# Patient Record
Sex: MALE | URBAN - METROPOLITAN AREA
[De-identification: names, ages, dates, MRNs, and addresses within clinical notes are randomized per-mention and may not be internally consistent; named-entity substitution may affect disease eponyms.]

---

## 2021-08-06 ENCOUNTER — CLINICAL SUPPORT (OUTPATIENT)
Dept: URGENT CARE | Facility: CLINIC | Age: 56
End: 2021-08-06
Payer: COMMERCIAL

## 2021-08-06 DIAGNOSIS — Z11.9 ENCOUNTER FOR SCREENING EXAMINATION FOR INFECTIOUS DISEASE: Primary | ICD-10-CM

## 2021-08-06 LAB
CTP QC/QA: YES
SARS-COV-2 RDRP RESP QL NAA+PROBE: NEGATIVE

## 2021-08-06 PROCEDURE — U0002: ICD-10-PCS | Mod: QW,S$GLB,, | Performed by: NURSE PRACTITIONER

## 2021-08-06 PROCEDURE — 99211 PR OFFICE/OUTPT VISIT, EST, LEVL I: ICD-10-PCS | Mod: S$GLB,,, | Performed by: NURSE PRACTITIONER

## 2021-08-06 PROCEDURE — U0002 COVID-19 LAB TEST NON-CDC: HCPCS | Mod: QW,S$GLB,, | Performed by: NURSE PRACTITIONER

## 2021-08-06 PROCEDURE — 99211 OFF/OP EST MAY X REQ PHY/QHP: CPT | Mod: S$GLB,,, | Performed by: NURSE PRACTITIONER

## 2022-07-20 ENCOUNTER — OFFICE VISIT (OUTPATIENT)
Dept: URGENT CARE | Facility: CLINIC | Age: 57
End: 2022-07-20
Payer: COMMERCIAL

## 2022-07-20 VITALS
HEART RATE: 79 BPM | DIASTOLIC BLOOD PRESSURE: 85 MMHG | WEIGHT: 211 LBS | RESPIRATION RATE: 20 BRPM | HEIGHT: 67 IN | SYSTOLIC BLOOD PRESSURE: 123 MMHG | OXYGEN SATURATION: 96 % | TEMPERATURE: 100 F | BODY MASS INDEX: 33.12 KG/M2

## 2022-07-20 DIAGNOSIS — U07.1 LAB TEST POSITIVE FOR DETECTION OF COVID-19 VIRUS: ICD-10-CM

## 2022-07-20 DIAGNOSIS — U07.1 COVID-19 VIRUS DETECTED: ICD-10-CM

## 2022-07-20 DIAGNOSIS — U07.1 COVID-19 VIRUS DETECTED: Primary | ICD-10-CM

## 2022-07-20 DIAGNOSIS — R05.9 COUGH: ICD-10-CM

## 2022-07-20 LAB
CTP QC/QA: YES
SARS-COV-2 RDRP RESP QL NAA+PROBE: POSITIVE

## 2022-07-20 PROCEDURE — 99213 PR OFFICE/OUTPT VISIT, EST, LEVL III, 20-29 MIN: ICD-10-PCS | Mod: S$GLB,,, | Performed by: NURSE PRACTITIONER

## 2022-07-20 PROCEDURE — 1160F PR REVIEW ALL MEDS BY PRESCRIBER/CLIN PHARMACIST DOCUMENTED: ICD-10-PCS | Mod: CPTII,S$GLB,, | Performed by: NURSE PRACTITIONER

## 2022-07-20 PROCEDURE — 3074F SYST BP LT 130 MM HG: CPT | Mod: CPTII,S$GLB,, | Performed by: NURSE PRACTITIONER

## 2022-07-20 PROCEDURE — 1159F MED LIST DOCD IN RCRD: CPT | Mod: CPTII,S$GLB,, | Performed by: NURSE PRACTITIONER

## 2022-07-20 PROCEDURE — 1159F PR MEDICATION LIST DOCUMENTED IN MEDICAL RECORD: ICD-10-PCS | Mod: CPTII,S$GLB,, | Performed by: NURSE PRACTITIONER

## 2022-07-20 PROCEDURE — 3008F PR BODY MASS INDEX (BMI) DOCUMENTED: ICD-10-PCS | Mod: CPTII,S$GLB,, | Performed by: NURSE PRACTITIONER

## 2022-07-20 PROCEDURE — 99213 OFFICE O/P EST LOW 20 MIN: CPT | Mod: S$GLB,,, | Performed by: NURSE PRACTITIONER

## 2022-07-20 PROCEDURE — 3074F PR MOST RECENT SYSTOLIC BLOOD PRESSURE < 130 MM HG: ICD-10-PCS | Mod: CPTII,S$GLB,, | Performed by: NURSE PRACTITIONER

## 2022-07-20 PROCEDURE — 3008F BODY MASS INDEX DOCD: CPT | Mod: CPTII,S$GLB,, | Performed by: NURSE PRACTITIONER

## 2022-07-20 PROCEDURE — U0002: ICD-10-PCS | Mod: QW,S$GLB,, | Performed by: NURSE PRACTITIONER

## 2022-07-20 PROCEDURE — U0002 COVID-19 LAB TEST NON-CDC: HCPCS | Mod: QW,S$GLB,, | Performed by: NURSE PRACTITIONER

## 2022-07-20 PROCEDURE — 1160F RVW MEDS BY RX/DR IN RCRD: CPT | Mod: CPTII,S$GLB,, | Performed by: NURSE PRACTITIONER

## 2022-07-20 PROCEDURE — 3079F PR MOST RECENT DIASTOLIC BLOOD PRESSURE 80-89 MM HG: ICD-10-PCS | Mod: CPTII,S$GLB,, | Performed by: NURSE PRACTITIONER

## 2022-07-20 PROCEDURE — 3079F DIAST BP 80-89 MM HG: CPT | Mod: CPTII,S$GLB,, | Performed by: NURSE PRACTITIONER

## 2022-07-20 RX ORDER — ATORVASTATIN CALCIUM 10 MG/1
10 TABLET, FILM COATED ORAL DAILY
COMMUNITY
Start: 2022-07-09

## 2022-07-20 NOTE — PATIENT INSTRUCTIONS
PLEASE READ YOUR DISCHARGE INSTRUCTIONS ENTIRELY AS IT CONTAINS IMPORTANT INFORMATION.    Patient had covid testing done today.  Discussed corona virus precautions and reviewed CDC FAC; printed a copy for patient.  I discussed to continue to monitor their symptoms. Discussed that if their symptoms persist or worsen to seek re-evaluation. Clinic vs. ER precautions were given.  Patient verbalized understanding and agreed with the entire plan of care.    If Negative and no direct exposure: symptom free without fever reducing meds in 24 hours - can go back to work in 24 hours with surgical mask for 10-14 days.    If Positive and significantly symptomatic: improved symptoms, no fever without fever reducing meds for 24 hours- have to be out for a minimum of 10 days passed from + test  with improvements in symptoms. MAY COME OUT OF QUARANTINE ON DAY 11.      If you tested positive and have symptoms, you must isolate for 5 days starting on the day of your first symptoms  OR day of the positive test if you are asymptomatic. After 5 days (ON DAY #6), if your symptoms have improved and you have not had fever on day 5, you can return to the community on day #6- NO TESTING REQUIRED to return to community! If no fever without fever reducing meds for 24 hours, before coming out of quarantine. After your 5 days of isolation are completed, the CDC recommends strict mask use for the first 5 days (day #6-10) that you come out of isolation.  MAY COME OUT OF QUARANTINE ON DAY#6 DATE** BUT CONTINUE STRICT MASKS FOR ANOTHER 5 DAYS. QUARANTINE START DATE**    This is the most important part, both the CDC and the [facility] emphasize that you do not test out of isolation. In fact, we do not retest if you were positive in the last 90 days.           - Reviewed radiographs and all diagnostic testing with patient/family.    - Rest.  Drink plenty of fluids.    - Tylenol OR anti-inflammatory (NSAIDs, ibuprofen, aleve, motrin) as directed as needed  for fever/pain.  For Tylenol, do not exceed 3000 mg/ day. If no contraindication or allergies.    - continue albuterol inhaler as needed for shortness of breath/wheezing  - do not operate machinery when taking cough syrup or pain meds as they may cause drowsiness.  - take Tessalon as needed for cough suppression. Take cough syrup as needed for cough during at night.     - If you were prescribed antibiotics, please take them to completion. Please supplement with OTC probiotics and yogurt.  Contact clinic if develop profuse diarrhea and weakness.  - If you are female and on birth control pills - please use additional methods of contraception to prevent pregnancy while on antibiotics and for one cycle after.     -Below are suggestions for symptomatic relief:              -Salt water gargles to soothe throat pain.              -Chloroseptic spray also helps to numb throat pain.              -Nasal saline spray reduces inflammation and dryness.              -Warm face compresses to help with facial sinus pain/pressure.              -Vicks vapor rub at night.              -Astelin NASAL SPRAY twice day for nasal/sinus congestion   **may also supplement with 2nd OTC nasal spray to help with inflammation and congestion. Wean to off when you nose becomes to dry or bleed. Also use nasal saline twice a day to help with dryness.               -Flonase OTC or Nasacort OTC  once a day for nasal/sinus congestion. DON'T USE IF YOU HAVE GLAUCOMA. CHECK WITH YOUR PHARMACIST/PHYSICIAN.              -Simple foods like chicken noodle soup.              -Mucinex DM (ANY COUGH EXPECTORANT) for cough or chest congestion with mucus during the day time. Delsym or robitussin (ANY COUGH SUPPRESSANT) helps with coughing at night. Mucinex-D if you have chest congestion or sputum (caution if history of high blood pressure or palpitations).              -Zyrtec/Claritin/xyzal during the day time  & Benadryl at night (only if severe runny nose) may  help with allergies and runny nose. Add decongestant if you have nasal/sinus congestion/sinus pressure/ear fullness sensation. (see below)              -may take OTC meclizine as needed for dizziness or nausea.     Caution with use of Decongestant meds:  -If you DO NOT have Hypertension or any history of palpitations, it is ok to take over the counter Sudafed or Mucinex D or Allegra-D or Claritin-D or Zyrtec-D.  -If you do take one of the above, it is ok to combine that with plain over the counter Mucinex or Allegra or Claritin or Zyrtec. If, for example, you are taking Zyrtec -D, you can combine that with Mucinex, but not Mucinex-D.  If you are taking Mucinex-D, you can combine that with plain Allegra or Claritin or Zyrtec.     -Do not combine pseudophed or phenylephrine with any other brand allergy-D for DECONGESTANT.   -Or vice versa, you can you take plain allergy medications (allegra/claritin/zyrtec with NO Decongestant) and ADD OTC pseudophed or phenelyphrine 2-3 times a day. Avoid taking decongestant late at night or with caffeine as it can keep you up or cause jittery feeling.    -If you DO have Hypertension , anxiety, or palpitations, it is safe to take Coricidin HBP for relief of sinus symptoms.      For your GI symptoms:  -Use gatorade/pedialyte or rehydration packets to help stay hydrated. Vitamin water and plain water do not contain rehydrating electrolytes.  -Increase clear liquids (water, gatorade, pedialyte, broths, jello, etc) Hold off on solids for 12-18 hours. Then advance to BRAT diet (banana, rice, applesauce, tea, toast/crackers), then advance further as tolerated. Avoid spicy or fatty foods.   -May take Emitrol OTC as needed for nausea.   -Use Peptobismol or Immodium to help alleviate your diarrhea symptoms.   -Take mylanta or simethicone for bloating or gas pain.   -Take pepcid or omeprazole if you have heartburn or reflux sensation.  -Avoid imodium unless you have more than 6 loose stools in  24 hours. Take 1 dose and monitor to see if you can repeat AS IT WILL CAUSE CONSTIPATION.  -Wash hands frequently while sick. Avoid ibuprofen or other NSAIDS until you are well.   -Please go to the ER if you experience worsening abdominal pain, blood in your vomit or stool, high fever, dizziness, fainting, swelling of your abdomen, inability to pass gas or stool, or inability to urinate.         -You must understand that you've received an Urgent Care treatment only and that you may be released before all your medical problems are known or treated. You, the patient, will arrange for follow up care as instructed. Please arrange follow up with your primary medical clinic within 2-5 days if your signs and symptoms have not resolved or worsen.     - Follow up with your PCP or specialty clinic as directed.  You can call [phone] or [phone] to schedule an appointment with the appropriate provider.  Schedule [facility] is open Mon-Friday 8-5pm (excluded holidays).    - If your condition worsens or fails to improve we recommend that you receive another evaluation at the emergency room immediately or contact your primary medical clinic to discuss your concerns.            Prevention steps for patients with confirmed or suspected COVID-19  Stay home and stay away from family members and friends. The CDC says, you can leave home after these three things have happened: 1) You have had no fever for at least 24 hours (that is one full day of no fever without the use of medicine that reduces fevers) 2) AND other symptoms have improved (for example, when your cough or shortness of breath have improved) 3) AND at least 10 days have passed since your symptoms first appeared OR after 7-10 days passed from first positive test.  Separate yourself from other people and animals in your home.  Call ahead before visiting your doctor.  Wear a facemask.  Cover your coughs and sneezes.  Wash your hands often with soap and water; hand   can be used, too.  Avoid sharing personal household items.  Wipe down surfaces used daily.  Monitor your symptoms. Seek prompt medical attention if your illness is worsening (e.g., difficulty breathing).   Before seeking care, call your healthcare provider.  If you have a medical emergency and need to call 911, notify the dispatch personnel that you have, or are being evaluated for COVID-19. If possible, put on a facemask before emergency medical services arrive.        Recommended precautions for household members, intimate partners, and caregivers in a home setting of a patient with symptomatic laboratory-confirmed COVID-19 or a patient under investigation.  Household members, intimate partners, and caregivers in the home setting awaiting tests results have close contact with a person with symptomatic, laboratory-confirmed COVID-19 or a person under investigation. Close contacts should monitor their health; they should call their provider right away if they develop symptoms suggestive of COVID-19 (e.g., fever, cough, shortness of breath).    Close contacts should also follow these recommendations:  Make sure that you understand and can help the patient follow their provider's instructions for medication(s) and care. You should help the patient with basic needs in the home and provide support for getting groceries, prescriptions, and other personal needs.  Monitor the patient's symptoms. If the patient is getting sicker, call his or her healthcare provider and tell them that the patient has laboratory-confirmed COVID-19. If the patient has a medical emergency and you need to call 911, notify the dispatch personnel that the patient has, or is being evaluated for COVID-19.  Household members should stay in another room or be  from the patient. Household members should use a separate bedroom and bathroom, if available.  Prohibit visitors.  Household members should care for any pets in the  home.  Make sure that shared spaces in the home have good air flow, such as by an air conditioner or an opened window, weather permitting.  Perform hand hygiene frequently. Wash your hands often with soap and water for at least 20 seconds or use an alcohol-based hand  (that contains > 60% alcohol) covering all surfaces of your hands and rubbing them together until they feel dry. Soap and water should be used preferentially.  Avoid touching your eyes, nose, and mouth.  The patient should wear a facemask. If the patient is not able to wear a facemask (for example, because it causes trouble breathing), caregivers should wear a mask when they are in the same room as the patient.  Wear a disposable facemask and gloves when you touch or have contact with the patient's blood, stool, or body fluids, such as saliva, sputum, nasal mucus, vomit, urine.  Throw out disposable facemasks and gloves after using them. Do not reuse.  When removing personal protective equipment, first remove and dispose of gloves. Then, immediately clean your hands with soap and water or alcohol-based hand . Next, remove and dispose of facemask, and immediately clean your hands again with soap and water or alcohol-based hand .  You should not share dishes, drinking glasses, cups, eating utensils, towels, bedding, or other items with the patient. After the patient uses these items, you should wash them thoroughly (see below Wash laundry thoroughly).  Clean all high-touch surfaces, such as counters, tabletops, doorknobs, bathroom fixtures, toilets, phones, keyboards, tablets, and bedside tables, every day. Also, clean any surfaces that may have blood, stool, or body fluids on them.  Use a household cleaning spray or wipe, according to the label instructions. Labels contain instructions for safe and effective use of the cleaning product including precautions you should take when applying the product, such as wearing gloves  and making sure you have good ventilation during use of the product.  Wash laundry thoroughly.  Immediately remove and wash clothes or bedding that have blood, stool, or body fluids on them.  Wear disposable gloves while handling soiled items and keep soiled items away from your body. Clean your hands (with soap and water or an alcohol-based hand ) immediately after removing your gloves.  Read and follow directions on labels of laundry or clothing items and detergent. In general, using a normal laundry detergent according to washing machine instructions and dry thoroughly using the warmest temperatures recommended on the clothing label.  Place all used disposable gloves, facemasks, and other contaminated items in a lined container before disposing of them with other household waste. Clean your hands (with soap and water or an alcohol-based hand ) immediately after handling these items. Soap and water should be used preferentially if hands are visibly dirty.  Discuss any additional questions with your state or local health department or healthcare provider. Check available hours when contacting your local health department.    For more information see CDC link below.      https://www.cdc.gov/coronavirus/2019-ncov/hcp/guidance-prevent-spread.html#precautions        Sources:  [facility], [place] [facility]          Instructions for Home Care of Patients and Caretakers with Coronavirus Disease 2019  Limit visitors to the home.  Older persons and those that have chronic medical conditions such as diabetes, lung and heart disease are at increased risk for illness.   If possible, patients should use a separate bedroom while recovering. Caregivers and household members should avoid prolonged contact with the patient which means to stay 6 feet away and avoid contact with cough droplets.  When close contact is necessary, wash your hands before and immediately after contact.   Perform hand  hygiene frequently. Wash your hands often with soap and water for at least 20 seconds or use an alcohol-based hand , covering all surfaces of your hands and rubbing them together until they feel dry.   Avoid touching your eyes, nose, and mouth with unwashed hands.  Avoid sharing household items with the patient. You should not share dishes, drinking glasses, cups, eating utensils, towels, bedding, or other items. After the patient uses these items, you should wash them thoroughly.  Wash laundry thoroughly.   Immediately remove and wash clothes or bedding that have blood, stool, or body fluids on them.  Clean all high-touch surfaces, such as counters, tabletops, doorknobs, bathroom fixtures, toilets, phones, keyboards, tablets, and bedside tables, every day.   Use a household cleaning spray or wipe, according to the label instructions. Labels contain instructions for safe and effective use of the cleaning product including precautions you should take when applying the product, such as wearing gloves and making sure you have good ventilation during use of the product.    For more information see CDC link below.      https://www.cdc.gov/coronavirus/2019-ncov/hcp/guidance-prevent-spread.html#precautions               If your symptoms worsen or if you have any other concerns, please contact [facility] at [phone].

## 2022-07-20 NOTE — PROGRESS NOTES
"Subjective:       Patient ID: Blake Jon is a 57 y.o. male.    Vitals:  height is 5' 7" (1.702 m) and weight is 95.7 kg (211 lb). His temperature is 99.5 °F (37.5 °C). His blood pressure is 123/85 and his pulse is 79. His respiration is 20 and oxygen saturation is 96%.     Chief Complaint: Sore Throat (Pt feels feverish, headaches for 3 days, runny nose, nasal congestion chest congestion, fatigue, )    Pt  Also has itchy throat     PROVIDER NOTE:    58 Y/O FEMALE PRESENTS TO  AFTER POSITIVE TEST AT HOME. REPORTS SORE THROAT, CONGESTION, AND COUGH. PT STATES THAT HE WANTED TO GET TESTED. WIFE WAS JUST RUSHED TO HOSPITAL.     Sore Throat   This is a new problem. The current episode started in the past 7 days. The problem has been gradually worsening. The pain is worse on the left side. There has been no fever. The pain is at a severity of 5/10. The pain is moderate. Associated symptoms include congestion and coughing. He has had no exposure to strep or mono. Treatments tried: bc powder, nasal spray, and cough drops  The treatment provided no relief.       HENT: Positive for congestion and sore throat.    Respiratory: Positive for cough.        Objective:      Physical Exam   HENT:   Head: Normocephalic and atraumatic.   Pulmonary/Chest: Effort normal. No respiratory distress.   Abdominal: Normal appearance.   Neurological: He is alert.   Nursing note and vitals reviewed.        Results for orders placed or performed in visit on 07/20/22   POCT COVID-19 Rapid Screening   Result Value Ref Range    POC Rapid COVID Positive (A) Negative     Acceptable Yes      Assessment:       1. COVID-19 virus detected    2. Cough    3. Lab test positive for detection of COVID-19 virus          Plan:     Telephone interview conducted in urgent care today. Exam denotes features of patient observation only.   Vitals stable. No evidence of respiratory distress noted, able to speak in complete sentences without pause.    " Requesting COVID-19 testing post exposure and given symptoms.   Tested for COVID-19 today. Rapid test was Positive. Educated on COVID-19 and COVID-19 testing. Advised on COVID-19 precautions. Discussed supportive care and OTC meds for symptom relief. Advised on return/follow-up precautions. Advised on ER precautions. Answered all patient questions. Patient verbalized understanding and voiced agreement with current treatment plan.            COVID-19 virus detected    Cough  -     POCT COVID-19 Rapid Screening    Lab test positive for detection of COVID-19 virus                  Patient Instructions     PLEASE READ YOUR DISCHARGE INSTRUCTIONS ENTIRELY AS IT CONTAINS IMPORTANT INFORMATION.    Patient had covid testing done today.  Discussed corona virus precautions and reviewed [facility] FAC; printed a copy for patient.  I discussed to continue to monitor their symptoms. Discussed that if their symptoms persist or worsen to seek re-evaluation. Clinic vs. ER precautions were given.  Patient verbalized understanding and agreed with the entire plan of care.    If Negative and no direct exposure: symptom free without fever reducing meds in 24 hours - can go back to work in 24 hours with surgical mask for 10-14 days.    If Positive and significantly symptomatic: improved symptoms, no fever without fever reducing meds for 24 hours- have to be out for a minimum of 10 days passed from + test  with improvements in symptoms. MAY COME OUT OF QUARANTINE ON DAY 11.      If you tested positive and have symptoms, you must isolate for 5 days starting on the day of your first symptoms  OR day of the positive test if you are asymptomatic. After 5 days (ON DAY #6), if your symptoms have improved and you have not had fever on day 5, you can return to the community on day #6- NO TESTING REQUIRED to return to community! If no fever without fever reducing meds for 24 hours, before coming out of quarantine. After your 5 days of isolation are  completed, the CDC recommends strict mask use for the first 5 days (day #6-10) that you come out of isolation.  MAY COME OUT OF QUARANTINE ON DAY#6 DATE** BUT CONTINUE STRICT MASKS FOR ANOTHER 5 DAYS. QUARANTINE START DATE**    This is the most important part, both the CDC and the [facility] emphasize that you do not test out of isolation. In fact, we do not retest if you were positive in the last 90 days.           - Reviewed radiographs and all diagnostic testing with patient/family.    - Rest.  Drink plenty of fluids.    - Tylenol OR anti-inflammatory (NSAIDs, ibuprofen, aleve, motrin) as directed as needed for fever/pain.  For Tylenol, do not exceed 3000 mg/ day. If no contraindication or allergies.    - continue albuterol inhaler as needed for shortness of breath/wheezing  - do not operate machinery when taking cough syrup or pain meds as they may cause drowsiness.  - take Tessalon as needed for cough suppression. Take cough syrup as needed for cough during at night.     - If you were prescribed antibiotics, please take them to completion. Please supplement with OTC probiotics and yogurt.  Contact clinic if develop profuse diarrhea and weakness.  - If you are female and on birth control pills - please use additional methods of contraception to prevent pregnancy while on antibiotics and for one cycle after.     -Below are suggestions for symptomatic relief:              -Salt water gargles to soothe throat pain.              -Chloroseptic spray also helps to numb throat pain.              -Nasal saline spray reduces inflammation and dryness.              -Warm face compresses to help with facial sinus pain/pressure.              -Vicks vapor rub at night.              -Astelin NASAL SPRAY twice day for nasal/sinus congestion   **may also supplement with 2nd OTC nasal spray to help with inflammation and congestion. Wean to off when you nose becomes to dry or bleed. Also use nasal saline twice a day to help with dryness.                -Flonase OTC or Nasacort OTC  once a day for nasal/sinus congestion. DON'T USE IF YOU HAVE GLAUCOMA. CHECK WITH YOUR PHARMACIST/PHYSICIAN.              -Simple foods like chicken noodle soup.              -Mucinex DM (ANY COUGH EXPECTORANT) for cough or chest congestion with mucus during the day time. Delsym or robitussin (ANY COUGH SUPPRESSANT) helps with coughing at night. Mucinex-D if you have chest congestion or sputum (caution if history of high blood pressure or palpitations).              -Zyrtec/Claritin/xyzal during the day time  & Benadryl at night (only if severe runny nose) may help with allergies and runny nose. Add decongestant if you have nasal/sinus congestion/sinus pressure/ear fullness sensation. (see below)              -may take OTC meclizine as needed for dizziness or nausea.     Caution with use of Decongestant meds:  -If you DO NOT have Hypertension or any history of palpitations, it is ok to take over the counter Sudafed or Mucinex D or Allegra-D or Claritin-D or Zyrtec-D.  -If you do take one of the above, it is ok to combine that with plain over the counter Mucinex or Allegra or Claritin or Zyrtec. If, for example, you are taking Zyrtec -D, you can combine that with Mucinex, but not Mucinex-D.  If you are taking Mucinex-D, you can combine that with plain Allegra or Claritin or Zyrtec.     -Do not combine pseudophed or phenylephrine with any other brand allergy-D for DECONGESTANT.   -Or vice versa, you can you take plain allergy medications (allegra/claritin/zyrtec with NO Decongestant) and ADD OTC pseudophed or phenelyphrine 2-3 times a day. Avoid taking decongestant late at night or with caffeine as it can keep you up or cause jittery feeling.    -If you DO have Hypertension , anxiety, or palpitations, it is safe to take Coricidin HBP for relief of sinus symptoms.      For your GI symptoms:  -Use gatorade/pedialyte or rehydration packets to help stay hydrated. Vitamin water  and plain water do not contain rehydrating electrolytes.  -Increase clear liquids (water, gatorade, pedialyte, broths, jello, etc) Hold off on solids for 12-18 hours. Then advance to BRAT diet (banana, rice, applesauce, tea, toast/crackers), then advance further as tolerated. Avoid spicy or fatty foods.   -May take Emitrol OTC as needed for nausea.   -Use Peptobismol or Immodium to help alleviate your diarrhea symptoms.   -Take mylanta or simethicone for bloating or gas pain.   -Take pepcid or omeprazole if you have heartburn or reflux sensation.  -Avoid imodium unless you have more than 6 loose stools in 24 hours. Take 1 dose and monitor to see if you can repeat AS IT WILL CAUSE CONSTIPATION.  -Wash hands frequently while sick. Avoid ibuprofen or other NSAIDS until you are well.   -Please go to the ER if you experience worsening abdominal pain, blood in your vomit or stool, high fever, dizziness, fainting, swelling of your abdomen, inability to pass gas or stool, or inability to urinate.         -You must understand that you've received an Urgent Care treatment only and that you may be released before all your medical problems are known or treated. You, the patient, will arrange for follow up care as instructed. Please arrange follow up with your primary medical clinic within 2-5 days if your signs and symptoms have not resolved or worsen.     - Follow up with your PCP or specialty clinic as directed.  You can call [phone] or [phone] to schedule an appointment with the appropriate provider.  Schedule [facility]ENTER is open Mon-Friday 8-5pm (excluded holidays).    - If your condition worsens or fails to improve we recommend that you receive another evaluation at the emergency room immediately or contact your primary medical clinic to discuss your concerns.            Prevention steps for patients with confirmed or suspected COVID-19   Stay home and stay away from family members and friends. The CDC says, you  can leave home after these three things have happened: 1) You have had no fever for at least 24 hours (that is one full day of no fever without the use of medicine that reduces fevers) 2) AND other symptoms have improved (for example, when your cough or shortness of breath have improved) 3) AND at least 10 days have passed since your symptoms first appeared OR after 7-10 days passed from first positive test.   Separate yourself from other people and animals in your home.   Call ahead before visiting your doctor.   Wear a facemask.   Cover your coughs and sneezes.   Wash your hands often with soap and water; hand  can be used, too.   Avoid sharing personal household items.   Wipe down surfaces used daily.   Monitor your symptoms. Seek prompt medical attention if your illness is worsening (e.g., difficulty breathing).    Before seeking care, call your healthcare provider.   If you have a medical emergency and need to call 911, notify the dispatch personnel that you have, or are being evaluated for COVID-19. If possible, put on a facemask before emergency medical services arrive.        Recommended precautions for household members, intimate partners, and caregivers in a home setting of a patient with symptomatic laboratory-confirmed COVID-19 or a patient under investigation.  Household members, intimate partners, and caregivers in the home setting awaiting tests results have close contact with a person with symptomatic, laboratory-confirmed COVID-19 or a person under investigation. Close contacts should monitor their health; they should call their provider right away if they develop symptoms suggestive of COVID-19 (e.g., fever, cough, shortness of breath).    Close contacts should also follow these recommendations:   Make sure that you understand and can help the patient follow their provider's instructions for medication(s) and care. You should help the patient with basic needs in the home and  provide support for getting groceries, prescriptions, and other personal needs.   Monitor the patient's symptoms. If the patient is getting sicker, call his or her healthcare provider and tell them that the patient has laboratory-confirmed COVID-19. If the patient has a medical emergency and you need to call 911, notify the dispatch personnel that the patient has, or is being evaluated for COVID-19.   Household members should stay in another room or be  from the patient. Household members should use a separate bedroom and bathroom, if available.   Prohibit visitors.   Household members should care for any pets in the home.   Make sure that shared spaces in the home have good air flow, such as by an air conditioner or an opened window, weather permitting.   Perform hand hygiene frequently. Wash your hands often with soap and water for at least 20 seconds or use an alcohol-based hand  (that contains > 60% alcohol) covering all surfaces of your hands and rubbing them together until they feel dry. Soap and water should be used preferentially.   Avoid touching your eyes, nose, and mouth.   The patient should wear a facemask. If the patient is not able to wear a facemask (for example, because it causes trouble breathing), caregivers should wear a mask when they are in the same room as the patient.   Wear a disposable facemask and gloves when you touch or have contact with the patient's blood, stool, or body fluids, such as saliva, sputum, nasal mucus, vomit, urine.  o Throw out disposable facemasks and gloves after using them. Do not reuse.  o When removing personal protective equipment, first remove and dispose of gloves. Then, immediately clean your hands with soap and water or alcohol-based hand . Next, remove and dispose of facemask, and immediately clean your hands again with soap and water or alcohol-based hand .   You should not share dishes, drinking glasses, cups,  eating utensils, towels, bedding, or other items with the patient. After the patient uses these items, you should wash them thoroughly (see below Wash laundry thoroughly).   Clean all high-touch surfaces, such as counters, tabletops, doorknobs, bathroom fixtures, toilets, phones, keyboards, tablets, and bedside tables, every day. Also, clean any surfaces that may have blood, stool, or body fluids on them.   Use a household cleaning spray or wipe, according to the label instructions. Labels contain instructions for safe and effective use of the cleaning product including precautions you should take when applying the product, such as wearing gloves and making sure you have good ventilation during use of the product.   Wash laundry thoroughly.  o Immediately remove and wash clothes or bedding that have blood, stool, or body fluids on them.  o Wear disposable gloves while handling soiled items and keep soiled items away from your body. Clean your hands (with soap and water or an alcohol-based hand ) immediately after removing your gloves.  o Read and follow directions on labels of laundry or clothing items and detergent. In general, using a normal laundry detergent according to washing machine instructions and dry thoroughly using the warmest temperatures recommended on the clothing label.   Place all used disposable gloves, facemasks, and other contaminated items in a lined container before disposing of them with other household waste. Clean your hands (with soap and water or an alcohol-based hand ) immediately after handling these items. Soap and water should be used preferentially if hands are visibly dirty.   Discuss any additional questions with your state or local health department or healthcare provider. Check available hours when contacting your local health department.    For more information see CDC link below.       https://www.cdc.gov/coronavirus/2019-ncov/hcp/guidance-prevent-spread.html#precautions        Sources:  [facility][note 3], [place] [facility]          Instructions for Home Care of Patients and Caretakers with Coronavirus Disease 2019   Limit visitors to the home.  Older persons and those that have chronic medical conditions such as diabetes, lung and heart disease are at increased risk for illness.    If possible, patients should use a separate bedroom while recovering. Caregivers and household members should avoid prolonged contact with the patient which means to stay 6 feet away and avoid contact with cough droplets.  When close contact is necessary, wash your hands before and immediately after contact.    Perform hand hygiene frequently. Wash your hands often with soap and water for at least 20 seconds or use an alcohol-based hand , covering all surfaces of your hands and rubbing them together until they feel dry.    Avoid touching your eyes, nose, and mouth with unwashed hands.   Avoid sharing household items with the patient. You should not share dishes, drinking glasses, cups, eating utensils, towels, bedding, or other items. After the patient uses these items, you should wash them thoroughly.   Wash laundry thoroughly.   o Immediately remove and wash clothes or bedding that have blood, stool, or body fluids on them.   Clean all high-touch surfaces, such as counters, tabletops, doorknobs, bathroom fixtures, toilets, phones, keyboards, tablets, and bedside tables, every day.   o Use a household cleaning spray or wipe, according to the label instructions. Labels contain instructions for safe and effective use of the cleaning product including precautions you should take when applying the product, such as wearing gloves and making sure you have good ventilation during use of the product.    For more information see CDC link below.       https://www.cdc.gov/coronavirus/2019-ncov/hcp/guidance-prevent-spread.html#precautions               If your symptoms worsen or if you have any other concerns, please contact [facility] at [phone].

## 2022-10-06 ENCOUNTER — PATIENT MESSAGE (OUTPATIENT)
Dept: RESEARCH | Facility: HOSPITAL | Age: 57
End: 2022-10-06
Payer: COMMERCIAL

## 2023-03-17 ENCOUNTER — PATIENT MESSAGE (OUTPATIENT)
Dept: RESEARCH | Facility: HOSPITAL | Age: 58
End: 2023-03-17
Payer: COMMERCIAL